# Patient Record
Sex: MALE | Race: BLACK OR AFRICAN AMERICAN | Employment: FULL TIME | RURAL
[De-identification: names, ages, dates, MRNs, and addresses within clinical notes are randomized per-mention and may not be internally consistent; named-entity substitution may affect disease eponyms.]

---

## 2023-11-13 NOTE — PROGRESS NOTES
Patient is new to this clinic. Comes in to establish care. Previous care was with . Reason for the change is: establish care and hard time holding water x 5 mo, notes a bulge in groin      Patricio Collins (:  1970) is a 46 y.o. male,New patient, here for evaluation of the following chief complaint(s):  Establish Care and Groin Swelling (Pain and swelling to L/groin x 5 mths)         ASSESSMENT/PLAN:  1. Left inguinal hernia  -     Ambulatory referral to General Surgery  2. Stress incontinence of urine  -     AMB POC URINALYSIS DIP STICK AUTO W/O MICRO  -     Ambulatory referral to General Surgery  3. Screening for colon cancer  -     Amb External Referral To Gastroenterology      Return in about 6 weeks (around 2023). Subjective   SUBJECTIVE/OBJECTIVE:  HPI  Notes left bulge in groin. No trauma. Here with wife. Tender if presses on it. Notes some urine freq, no dysuria, bulge is tender if pushes on it, will wet occa    Review of Systems   Min abdo pain  No dysuria    No meds  No Known Allergies    History reviewed. No pertinent past medical history. History reviewed. No pertinent surgical history. Social History     Socioeconomic History    Marital status: Single     Spouse name: Not on file    Number of children: Not on file    Years of education: Not on file    Highest education level: Not on file   Occupational History    Not on file   Tobacco Use    Smoking status: Every Day     Types: Cigars    Smokeless tobacco: Never   Substance and Sexual Activity    Alcohol use:  Yes     Alcohol/week: 21.0 standard drinks of alcohol     Types: 21 Cans of beer per week    Drug use: Never    Sexual activity: Not on file   Other Topics Concern    Not on file   Social History Narrative    Lives with wife     Social Determinants of Health     Financial Resource Strain: Low Risk  (2023)    Overall Financial Resource Strain (CARDIA)     Difficulty of Paying Living Expenses: Not hard at all   Food

## 2023-11-14 ENCOUNTER — OFFICE VISIT (OUTPATIENT)
Facility: CLINIC | Age: 53
End: 2023-11-14

## 2023-11-14 VITALS
RESPIRATION RATE: 16 BRPM | OXYGEN SATURATION: 98 % | BODY MASS INDEX: 20.72 KG/M2 | HEIGHT: 71 IN | HEART RATE: 89 BPM | DIASTOLIC BLOOD PRESSURE: 81 MMHG | SYSTOLIC BLOOD PRESSURE: 134 MMHG | TEMPERATURE: 98.6 F | WEIGHT: 148 LBS

## 2023-11-14 DIAGNOSIS — Z12.11 SCREENING FOR COLON CANCER: ICD-10-CM

## 2023-11-14 DIAGNOSIS — N39.3 STRESS INCONTINENCE OF URINE: ICD-10-CM

## 2023-11-14 DIAGNOSIS — K40.90 LEFT INGUINAL HERNIA: Primary | ICD-10-CM

## 2023-11-14 LAB
BILIRUBIN, URINE, POC: ABNORMAL
BLOOD URINE, POC: ABNORMAL
GLUCOSE URINE, POC: NEGATIVE
KETONES, URINE, POC: ABNORMAL
LEUKOCYTE ESTERASE, URINE, POC: NEGATIVE
NITRITE, URINE, POC: NEGATIVE
PH, URINE, POC: 5.5 (ref 4.6–8)
PROTEIN,URINE, POC: ABNORMAL
SPECIFIC GRAVITY, URINE, POC: 1.02 (ref 1–1.03)
URINALYSIS CLARITY, POC: CLEAR
URINALYSIS COLOR, POC: ABNORMAL
UROBILINOGEN, POC: ABNORMAL

## 2023-11-14 SDOH — ECONOMIC STABILITY: INCOME INSECURITY: HOW HARD IS IT FOR YOU TO PAY FOR THE VERY BASICS LIKE FOOD, HOUSING, MEDICAL CARE, AND HEATING?: NOT HARD AT ALL

## 2023-11-14 SDOH — ECONOMIC STABILITY: FOOD INSECURITY: WITHIN THE PAST 12 MONTHS, YOU WORRIED THAT YOUR FOOD WOULD RUN OUT BEFORE YOU GOT MONEY TO BUY MORE.: NEVER TRUE

## 2023-11-14 SDOH — ECONOMIC STABILITY: HOUSING INSECURITY
IN THE LAST 12 MONTHS, WAS THERE A TIME WHEN YOU DID NOT HAVE A STEADY PLACE TO SLEEP OR SLEPT IN A SHELTER (INCLUDING NOW)?: NO

## 2023-11-14 SDOH — ECONOMIC STABILITY: FOOD INSECURITY: WITHIN THE PAST 12 MONTHS, THE FOOD YOU BOUGHT JUST DIDN'T LAST AND YOU DIDN'T HAVE MONEY TO GET MORE.: NEVER TRUE

## 2023-11-14 ASSESSMENT — PATIENT HEALTH QUESTIONNAIRE - PHQ9
SUM OF ALL RESPONSES TO PHQ QUESTIONS 1-9: 2
SUM OF ALL RESPONSES TO PHQ9 QUESTIONS 1 & 2: 2
SUM OF ALL RESPONSES TO PHQ QUESTIONS 1-9: 2
SUM OF ALL RESPONSES TO PHQ QUESTIONS 1-9: 2
2. FEELING DOWN, DEPRESSED OR HOPELESS: 1
SUM OF ALL RESPONSES TO PHQ QUESTIONS 1-9: 2
1. LITTLE INTEREST OR PLEASURE IN DOING THINGS: 1

## 2023-11-14 ASSESSMENT — ANXIETY QUESTIONNAIRES
2. NOT BEING ABLE TO STOP OR CONTROL WORRYING: 1
IF YOU CHECKED OFF ANY PROBLEMS ON THIS QUESTIONNAIRE, HOW DIFFICULT HAVE THESE PROBLEMS MADE IT FOR YOU TO DO YOUR WORK, TAKE CARE OF THINGS AT HOME, OR GET ALONG WITH OTHER PEOPLE: NOT DIFFICULT AT ALL
1. FEELING NERVOUS, ANXIOUS, OR ON EDGE: 1

## 2023-12-11 ENCOUNTER — OFFICE VISIT (OUTPATIENT)
Age: 53
End: 2023-12-11

## 2023-12-11 ENCOUNTER — PREP FOR PROCEDURE (OUTPATIENT)
Age: 53
End: 2023-12-11

## 2023-12-11 VITALS
BODY MASS INDEX: 21.45 KG/M2 | HEIGHT: 71 IN | SYSTOLIC BLOOD PRESSURE: 167 MMHG | HEART RATE: 95 BPM | RESPIRATION RATE: 20 BRPM | WEIGHT: 153.2 LBS | DIASTOLIC BLOOD PRESSURE: 91 MMHG | TEMPERATURE: 97.6 F | OXYGEN SATURATION: 97 %

## 2023-12-11 DIAGNOSIS — K40.20 BILATERAL INGUINAL HERNIA WITHOUT OBSTRUCTION OR GANGRENE, RECURRENCE NOT SPECIFIED: Primary | ICD-10-CM

## 2023-12-11 RX ORDER — TAMSULOSIN HYDROCHLORIDE 0.4 MG/1
0.4 CAPSULE ORAL DAILY
Qty: 30 CAPSULE | Refills: 1 | Status: SHIPPED | OUTPATIENT
Start: 2023-12-11

## 2023-12-11 ASSESSMENT — PATIENT HEALTH QUESTIONNAIRE - PHQ9
SUM OF ALL RESPONSES TO PHQ QUESTIONS 1-9: 0
1. LITTLE INTEREST OR PLEASURE IN DOING THINGS: 0
SUM OF ALL RESPONSES TO PHQ QUESTIONS 1-9: 0
2. FEELING DOWN, DEPRESSED OR HOPELESS: 0
SUM OF ALL RESPONSES TO PHQ9 QUESTIONS 1 & 2: 0

## 2023-12-11 ASSESSMENT — ENCOUNTER SYMPTOMS
EYE PAIN: 0
BLOOD IN STOOL: 0
SHORTNESS OF BREATH: 0

## 2023-12-11 NOTE — PROGRESS NOTES
Dorethia Gaucher (:  1970) is a 46 y.o. male, here for evaluation of the following chief complaint(s):  New Patient (Seen at the request of Dr. Mark Paniagua for the evaluation of a possible left inguinal hernia. )         ASSESSMENT/PLAN:  1. Bilateral inguinal hernia without obstruction or gangrene, recurrence not specified        Dorethia Gaucher is having symptoms. I have recommended to him that we proceed with surgery. I had an extensive discussion with him regarding the risks, benefits, and alternatives of proceeding with a Laparoscopic bilateral Inguinal Hernia Repair with Mesh, Robot Assisted. Risks,benefits, and alternatives were discussed including the risk of anesthesia, bleeding, infection, including mesh infection, chronic orchialgia, neuralgia, other pain syndromes, testicular ischemia, conversion to open, injury to bowel, and recurrence were discussed. We discussed his increased risk of complication including recurrence due to his nicotine use. Ideally he would quit and we would proceed with surgery 6 weeks after his quit date to reduce these risks. He however is not ready to quit at this time and he expressed understanding of the increased risks. He has an increased risk of postoperative urinary retention due to his urinary symptoms. Will start him on Flomax in the perioperative period to try to help reduce this risk. Orders Placed This Encounter    tamsulosin (FLOMAX) 0.4 MG capsule     Sig: Take 1 capsule by mouth daily     Dispense:  30 capsule     Refill:  1       He is in agreement to proceed. We will schedule him at his earliest convenience. Subjective   HPI:  HPI  Left groin bulge. Sometimes pain  No nausea  BMs ok    Concerned that it may be exacerbating urinary symptoms  Has frequency and nocturia and some urgency    4 cigars  +nicotine    Review of Systems   Constitutional:  Negative for chills, fever and unexpected weight change. HENT:  Negative for ear pain.

## 2023-12-12 ENCOUNTER — TELEPHONE (OUTPATIENT)
Age: 53
End: 2023-12-12

## 2023-12-12 NOTE — TELEPHONE ENCOUNTER
Call out to patient to remind him to start his flomax tomorrow. No answer and voice mail box is not set up. Spoke with Isabelle canchola on his release of info form and let her know that he needs to start his flomax tomorrow.  She said they were picking it up today and he will start it tomorrow

## 2024-01-08 ENCOUNTER — OFFICE VISIT (OUTPATIENT)
Age: 54
End: 2024-01-08

## 2024-01-08 VITALS
DIASTOLIC BLOOD PRESSURE: 87 MMHG | WEIGHT: 149.8 LBS | HEART RATE: 110 BPM | TEMPERATURE: 98.2 F | RESPIRATION RATE: 16 BRPM | BODY MASS INDEX: 20.97 KG/M2 | SYSTOLIC BLOOD PRESSURE: 128 MMHG | OXYGEN SATURATION: 95 % | HEIGHT: 71 IN

## 2024-01-08 DIAGNOSIS — Z09 POSTOPERATIVE EXAMINATION: Primary | ICD-10-CM

## 2024-01-08 PROCEDURE — 99024 POSTOP FOLLOW-UP VISIT: CPT | Performed by: SURGERY

## 2024-01-08 ASSESSMENT — PATIENT HEALTH QUESTIONNAIRE - PHQ9
SUM OF ALL RESPONSES TO PHQ9 QUESTIONS 1 & 2: 0
1. LITTLE INTEREST OR PLEASURE IN DOING THINGS: 0
SUM OF ALL RESPONSES TO PHQ QUESTIONS 1-9: 0
2. FEELING DOWN, DEPRESSED OR HOPELESS: 0

## 2024-01-08 NOTE — PROGRESS NOTES
Chief Complaint   Patient presents with    Post-Op Check     S/p Laparoscopic repair of bilateral inguinal hernia with mesh, robot assisted on 12/20/23.       Tolerating PO  BMs: normal    Pain controlled with pain meds      Physical Exam:   Abdominal exam: Soft, non-distended, appropriatly tender.    Wound: clean, dry, no drainage    Doing well  Continue restricted activity for a total of 4 weeks  Follow-up: iveth Moreno MD FACS

## 2024-01-08 NOTE — PROGRESS NOTES
Identified pt with two pt identifiers(name and ). Reviewed record in preparation for visit and have obtained necessary documentation. All patient medications has been reviewed.    Chief Complaint   Patient presents with    Post-Op Check     S/p Laparoscopic repair of bilateral inguinal hernia with mesh, robot assisted on 23.       Health Maintenance Due   Topic    Hepatitis B vaccine (1 of 3 - 3-dose series)    COVID-19 Vaccine (1)    Pneumococcal 0-64 years Vaccine (1 - PCV)    HIV screen     Hepatitis C screen     DTaP/Tdap/Td vaccine (1 - Tdap)    Lipids     Colorectal Cancer Screen     Shingles vaccine (1 of 2)    Flu vaccine (1)       Vitals:    24 1532   BP: 128/87   Site: Left Upper Arm   Position: Sitting   Cuff Size: Large Adult   Pulse: (!) 110   Resp: 16   Temp: 98.2 °F (36.8 °C)   TempSrc: Oral   SpO2: 95%   Weight: 67.9 kg (149 lb 12.8 oz)   Height: 1.803 m (5' 11\")        Pain Score:   0 - No pain         4.Have you been to the ER, urgent care clinic since your last visit?  Hospitalized since your last visit? no    5. Have you seen or consulted any other health care providers outside of the [facility] since your last visit?  Include any pap smears or colon screening. no    Patient is accompanied by self. I have received verbal consent from Antoine Sherman to discuss any/all medical information while they are present in the room.

## 2024-07-28 NOTE — PROGRESS NOTES
Antoine Sherman (:  1970) is a 54 y.o. male, Established patient, here for evaluation of the following chief complaint(s): left shoulder pAIN  Shoulder Pain (L/shoulder pain x 3 weeks/L/fingers numb, spasm o L/arm)         Assessment & Plan  1. Left shoulder pain.  The left shoulder pain is likely due to rotator cuff tendinitis, which is a common cause of shoulder discomfort. The absence of biceps tenderness and point tenderness at the acromioclavicular (AC) joints make arthritis less likely. The numbness in his hand could be attributed to an inflamed nerve. A short course of prednisone was recommended, with a regimen of 20 mg twice daily for the first three days, followed by 20 mg once daily for the subsequent four days. He was advised to take the medication in the morning and to rest his arm for a few days. A work note was provided. If the condition does not improve, he is to return for a potential injection.      Results    1. Acute pain of left shoulder  -     predniSONE (DELTASONE) 20 MG tablet; 2 daily for 3 days, then one daily for 4 days, then stop., Disp-10 tablet, R-0Normal          Diagnosis Orders   1. Acute pain of left shoulder  predniSONE (DELTASONE) 20 MG tablet        Orders Placed This Encounter    predniSONE (DELTASONE) 20 MG tablet     Si daily for 3 days, then one daily for 4 days, then stop.     Dispense:  10 tablet     Refill:  0     Current Outpatient Medications   Medication Sig Dispense Refill    predniSONE (DELTASONE) 20 MG tablet 2 daily for 3 days, then one daily for 4 days, then stop. 10 tablet 0     No current facility-administered medications for this visit.     Return if symptoms worsen or fail to improve.      Subjective   History of Present Illness  The patient is a 54-year-old male who presents for evaluation of left shoulder pain. He is accompanied by his fiancée.    He has been experiencing severe pain in his left shoulder for the past 3 weeks, which he describes as a

## 2024-07-29 ENCOUNTER — OFFICE VISIT (OUTPATIENT)
Facility: CLINIC | Age: 54
End: 2024-07-29
Payer: MEDICAID

## 2024-07-29 VITALS
WEIGHT: 138 LBS | SYSTOLIC BLOOD PRESSURE: 148 MMHG | HEIGHT: 71 IN | RESPIRATION RATE: 16 BRPM | BODY MASS INDEX: 19.32 KG/M2 | TEMPERATURE: 97.8 F | OXYGEN SATURATION: 98 % | HEART RATE: 91 BPM | DIASTOLIC BLOOD PRESSURE: 91 MMHG

## 2024-07-29 DIAGNOSIS — M25.512 ACUTE PAIN OF LEFT SHOULDER: Primary | ICD-10-CM

## 2024-07-29 PROBLEM — N39.3 STRESS INCONTINENCE OF URINE: Status: RESOLVED | Noted: 2023-11-14 | Resolved: 2024-07-29

## 2024-07-29 PROBLEM — K40.90 LEFT INGUINAL HERNIA: Status: RESOLVED | Noted: 2023-11-14 | Resolved: 2024-07-29

## 2024-07-29 PROBLEM — K40.20 BILATERAL INGUINAL HERNIA: Status: RESOLVED | Noted: 2023-12-11 | Resolved: 2024-07-29

## 2024-07-29 PROCEDURE — 99213 OFFICE O/P EST LOW 20 MIN: CPT | Performed by: FAMILY MEDICINE

## 2024-07-29 RX ORDER — PREDNISONE 20 MG/1
TABLET ORAL
Qty: 10 TABLET | Refills: 0 | Status: SHIPPED | OUTPATIENT
Start: 2024-07-29

## 2024-07-29 ASSESSMENT — PATIENT HEALTH QUESTIONNAIRE - PHQ9
1. LITTLE INTEREST OR PLEASURE IN DOING THINGS: SEVERAL DAYS
SUM OF ALL RESPONSES TO PHQ QUESTIONS 1-9: 2
SUM OF ALL RESPONSES TO PHQ9 QUESTIONS 1 & 2: 2
2. FEELING DOWN, DEPRESSED OR HOPELESS: SEVERAL DAYS

## 2024-07-29 NOTE — PROGRESS NOTES
Chief Complaint   Patient presents with    Shoulder Pain     L/shoulder pain x 3 weeks  L/fingers numb, spasm o L/arm     Patient has not been out of the country in (14 months), NO diarrhea, NO cough, NO chest conjestion, NO temp.  Pt has not been around anyone with these symptoms.     Health Maintenance reviewed.    I have reviewed the patient's medical history in detail and updated the computerized patient record.    \"Have you been to the ER, urgent care clinic since your last visit?  Yes  Hospitalized since your last visit?\"    no    “Have you seen or consulted any other health care providers outside of [facility] since your last visit?”    no    “Have you had a colorectal cancer screening such as a colonoscopy/FIT/Cologuard?    no    No colonoscopy on file  No cologuard on file  No FIT/FOBT on file   No flexible sigmoidoscopy on file

## 2024-09-05 ENCOUNTER — OFFICE VISIT (OUTPATIENT)
Facility: CLINIC | Age: 54
End: 2024-09-05
Payer: MEDICAID

## 2024-09-05 VITALS
DIASTOLIC BLOOD PRESSURE: 75 MMHG | RESPIRATION RATE: 16 BRPM | TEMPERATURE: 98 F | HEIGHT: 71 IN | OXYGEN SATURATION: 98 % | SYSTOLIC BLOOD PRESSURE: 132 MMHG | BODY MASS INDEX: 20.3 KG/M2 | WEIGHT: 145 LBS | HEART RATE: 85 BPM

## 2024-09-05 DIAGNOSIS — Z13.220 SCREENING CHOLESTEROL LEVEL: ICD-10-CM

## 2024-09-05 DIAGNOSIS — Z13.1 SCREENING FOR DIABETES MELLITUS: ICD-10-CM

## 2024-09-05 DIAGNOSIS — M25.512 LEFT SHOULDER PAIN, UNSPECIFIED CHRONICITY: Primary | ICD-10-CM

## 2024-09-05 PROCEDURE — 99213 OFFICE O/P EST LOW 20 MIN: CPT | Performed by: FAMILY MEDICINE

## 2024-09-05 ASSESSMENT — PATIENT HEALTH QUESTIONNAIRE - PHQ9
SUM OF ALL RESPONSES TO PHQ QUESTIONS 1-9: 2
1. LITTLE INTEREST OR PLEASURE IN DOING THINGS: SEVERAL DAYS
2. FEELING DOWN, DEPRESSED OR HOPELESS: SEVERAL DAYS
SUM OF ALL RESPONSES TO PHQ9 QUESTIONS 1 & 2: 2
SUM OF ALL RESPONSES TO PHQ QUESTIONS 1-9: 2

## 2024-09-05 NOTE — PROGRESS NOTES
Chief Complaint   Patient presents with    Shoulder Pain     L/shoulder pain  Spam's to L/arm and numbers to L/fingers     Patient has not been out of the country in (14 months), NO diarrhea, NO cough, NO chest conjestion, NO temp.  Pt has not been around anyone with these symptoms.     Health Maintenance reviewed.    I have reviewed the patient's medical history in detail and updated the computerized patient record.    \"Have you been to the ER, urgent care clinic since your last visit? No  Hospitalized since your last visit?\"    no    “Have you seen or consulted any other health care providers outside of [facility] since your last visit?”    no    “Have you had a colorectal cancer screening such as a colonoscopy/FIT/Cologuard?    no    No colonoscopy on file  No cologuard on file  No FIT/FOBT on file   No flexible sigmoidoscopy on file

## 2024-09-05 NOTE — PROGRESS NOTES
Antoine Sherman (:  1970) is a 54 y.o. male, Established patient, here for evaluation of the following chief complaint(s): As below  Shoulder Pain (L/shoulder pain/Spam's to L/arm and numbers to L/fingers)         Assessment & Plan  1. Left shoulder pain.  The patient reports persistent pain in the left shoulder, which has been ongoing for a couple of months. He does not recall any specific injury but mentioned a fall that occurred a while back. Physical examination reveals positive impingement signs and tenderness around the acromioclavicular process. Differential diagnosis includes rotator cuff tendinitis. Potential complications such as infection and rare gout-like reactions were discussed. He has opted to see an orthopedic specialist for further evaluation and management.        Results    1. Left shoulder pain, unspecified chronicity  -     Amb External Referral To Orthopedic Surgery  2. Screening for diabetes mellitus  -     Basic Metabolic Panel; Future  3. Screening cholesterol level  -     Lipid Panel; Future          Diagnosis Orders   1. Left shoulder pain, unspecified chronicity  Amb External Referral To Orthopedic Surgery      2. Screening for diabetes mellitus  Basic Metabolic Panel      3. Screening cholesterol level  Lipid Panel        Orders Placed This Encounter    Basic Metabolic Panel     Standing Status:   Future     Standing Expiration Date:   2025    Lipid Panel     Standing Status:   Future     Standing Expiration Date:   2025    Amb External Referral To Orthopedic Surgery     Referral Priority:   Routine     Referral Type:   Consult for Advice and Opinion     Referral Reason:   Specialty Services Required     Referred to Provider:   Leandro Gonzalez MD     Requested Specialty:   Orthopedic Surgery     Number of Visits Requested:   1     No current outpatient medications on file.     No current facility-administered medications for this visit.     Return in about 6 weeks (around